# Patient Record
Sex: FEMALE | Race: WHITE | NOT HISPANIC OR LATINO | Employment: FULL TIME | URBAN - METROPOLITAN AREA
[De-identification: names, ages, dates, MRNs, and addresses within clinical notes are randomized per-mention and may not be internally consistent; named-entity substitution may affect disease eponyms.]

---

## 2017-05-03 ENCOUNTER — OFFICE VISIT (OUTPATIENT)
Dept: RETAIL CLINIC | Facility: CLINIC | Age: 52
End: 2017-05-03

## 2017-05-03 VITALS
SYSTOLIC BLOOD PRESSURE: 104 MMHG | DIASTOLIC BLOOD PRESSURE: 70 MMHG | TEMPERATURE: 97.3 F | HEART RATE: 59 BPM | RESPIRATION RATE: 20 BRPM | OXYGEN SATURATION: 98 %

## 2017-05-03 DIAGNOSIS — N39.0 UTI (URINARY TRACT INFECTION), BACTERIAL: Primary | ICD-10-CM

## 2017-05-03 DIAGNOSIS — A49.9 UTI (URINARY TRACT INFECTION), BACTERIAL: Primary | ICD-10-CM

## 2017-05-03 LAB
BILIRUB BLD-MCNC: NEGATIVE MG/DL
CLARITY, POC: ABNORMAL
COLOR UR: YELLOW
GLUCOSE UR STRIP-MCNC: NEGATIVE MG/DL
KETONES UR QL: NEGATIVE
LEUKOCYTE EST, POC: ABNORMAL
NITRITE UR-MCNC: POSITIVE MG/ML
PH UR: 6.5 [PH] (ref 5–8)
PROT UR STRIP-MCNC: ABNORMAL MG/DL
RBC # UR STRIP: ABNORMAL /UL
SP GR UR: 1.03 (ref 1–1.03)
UROBILINOGEN UR QL: NORMAL

## 2017-05-03 PROCEDURE — 81003 URINALYSIS AUTO W/O SCOPE: CPT | Performed by: NURSE PRACTITIONER

## 2017-05-03 PROCEDURE — 99203 OFFICE O/P NEW LOW 30 MIN: CPT | Performed by: NURSE PRACTITIONER

## 2017-05-03 RX ORDER — NITROFURANTOIN 25; 75 MG/1; MG/1
100 CAPSULE ORAL 2 TIMES DAILY
Qty: 10 CAPSULE | Refills: 0 | Status: SHIPPED | OUTPATIENT
Start: 2017-05-03 | End: 2017-05-08

## 2017-05-03 RX ORDER — PHENAZOPYRIDINE HYDROCHLORIDE 200 MG/1
200 TABLET, FILM COATED ORAL 3 TIMES DAILY PRN
Qty: 6 TABLET | Refills: 0 | Status: SHIPPED | OUTPATIENT
Start: 2017-05-03 | End: 2017-05-05

## 2021-02-17 ENCOUNTER — HOSPITAL ENCOUNTER (EMERGENCY)
Facility: HOSPITAL | Age: 56
Discharge: HOME | End: 2021-02-17
Attending: EMERGENCY MEDICINE | Admitting: EMERGENCY MEDICINE

## 2021-02-17 ENCOUNTER — APPOINTMENT (OUTPATIENT)
Dept: GENERAL RADIOLOGY | Facility: HOSPITAL | Age: 56
End: 2021-02-17

## 2021-02-17 VITALS
DIASTOLIC BLOOD PRESSURE: 77 MMHG | WEIGHT: 200 LBS | HEIGHT: 66 IN | HEART RATE: 67 BPM | TEMPERATURE: 98.1 F | OXYGEN SATURATION: 98 % | RESPIRATION RATE: 18 BRPM | SYSTOLIC BLOOD PRESSURE: 115 MMHG | BODY MASS INDEX: 32.14 KG/M2

## 2021-02-17 DIAGNOSIS — S93.402A MODERATE ANKLE SPRAIN, LEFT, INITIAL ENCOUNTER: Primary | ICD-10-CM

## 2021-02-17 PROCEDURE — 99283 EMERGENCY DEPT VISIT LOW MDM: CPT

## 2021-02-17 PROCEDURE — 99283 EMERGENCY DEPT VISIT LOW MDM: CPT | Performed by: EMERGENCY MEDICINE

## 2021-02-17 PROCEDURE — 73610 X-RAY EXAM OF ANKLE: CPT

## 2021-02-17 NOTE — ED PROVIDER NOTES
"Subjective   Laura Barney is a 54 yo WF who presents secondary to left ankle injury.  Patient states she was stepping over small amount of snow in her driveway to get into her vehicle.  Her right foot slipped.  She fell on her buttocks injuring her left ankle on the way.  Patient is status post ORIF of left ankle secondary to horse falling on her in 2007 or 2008.  Patient states \"I broke it\" referring to today's injury.  No other injury.  Patient presents for evaluation.      History provided by:  Patient      Review of Systems   Constitutional: Negative.  Negative for fever.   HENT: Negative.  Negative for rhinorrhea.    Eyes: Negative.  Negative for redness.   Respiratory: Negative for cough.    Cardiovascular: Negative for chest pain.   Gastrointestinal: Negative for abdominal pain.   Endocrine: Negative.    Genitourinary: Negative.  Negative for difficulty urinating.   Musculoskeletal: Negative.  Negative for back pain.   Skin: Negative.  Negative for color change.   Neurological: Negative.  Negative for syncope.   Hematological: Negative.    Psychiatric/Behavioral: Negative.    All other systems reviewed and are negative.      Past Medical History:   Diagnosis Date   • Herpes simplex        Allergies   Allergen Reactions   • Penicillins Hives       Past Surgical History:   Procedure Laterality Date   • ANKLE SURGERY Left     hardware after fracture   • CLOSED REDUCTION DISTAL RADIUS FRACTURE Bilateral    • FRACTURE SURGERY Bilateral     upper extremities with hardware placement after fracture       Family History   Problem Relation Age of Onset   • Heart disease Mother    • No Known Problems Father        Social History     Socioeconomic History   • Marital status:      Spouse name: Not on file   • Number of children: Not on file   • Years of education: Not on file   • Highest education level: Not on file   Tobacco Use   • Smoking status: Former Smoker     Types: Cigarettes     Quit date: 2007     " Years since quittin.1   • Smokeless tobacco: Never Used   Substance and Sexual Activity   • Alcohol use: Never     Frequency: Never   • Drug use: Defer   • Sexual activity: Defer           Objective   Physical Exam  Vitals signs and nursing note reviewed.   Constitutional:       General: She is not in acute distress.     Appearance: Normal appearance. She is not ill-appearing or diaphoretic.      Comments: 55-year-old white female laying in bed.  Patient appears in good overall health.  She is a bit overweight.   HENT:      Head: Normocephalic and atraumatic.   Musculoskeletal:      Left ankle: She exhibits decreased range of motion (Secondary to pain) and swelling (Slight lateral malleolus.). She exhibits no deformity and normal pulse. Tenderness. Lateral malleolus tenderness found. No medial malleolus, no posterior TFL, no head of 5th metatarsal and no proximal fibula tenderness found. Achilles tendon normal.        Feet:    Skin:     General: Skin is warm and dry.      Capillary Refill: Capillary refill takes less than 2 seconds.   Neurological:      General: No focal deficit present.      Mental Status: She is alert and oriented to person, place, and time.   Psychiatric:         Mood and Affect: Mood normal.         Behavior: Behavior normal.         Procedures           ED Course  ED Course as of  0900   Wed 2021   0811 Mild tenderness and swelling along lateral malleolus.  No other injury.  Obtaining x-ray of left ankle.    [SS]   0857 X-rays only notable for soft tissue swelling and postsurgical changes.  Will place patient in a walking boot for support.  Ortho for follow-up.I have discussed at length with patient (including family if appropriate) all results, diagnoses, treatment, indications to return to emergency room and follow-up.  Will d/c home.        [SS]      ED Course User Index  [SS] Messi Steven MD      Xr Ankle 3+ View Left    Result Date: 2021  Narrative: XR ANKLE  3+ VW LEFT-: 2/17/2021 8:38 AM  INDICATION: Slip and fall this morning. Pain when trying to stand up. Swelling laterally and history of prior ankle surgery..  COMPARISON: 09/13/2008.  FINDINGS: 3 view(s) of the left ankle.  There is lateral soft tissue swelling. No distinct acute fracture. There are faint lucencies adjacent to the surgical hardware present in the distal fibula but no cortical disruption is present and these are nonspecific but likely relate to sequela of prior surgery. The patient is status post screw and plate fixation of a distal fibular fracture and threaded screw fixation of a medial malleolar fracture. No bone erosion or destruction.  No foreign body.      Impression:  1. No acute fracture. Lateral soft tissue swelling and post operative changes related to bimalleolar fracture repair..  This report was finalized on 2/17/2021 8:48 AM by Dr. Bladimir Currie MD.      My diagnosis for lower extremity pain and injury includes but is not limited to hip fracture, femur fracture, hip dislocation, hip contusion, hip sprain, hip strain, pelvic fracture, knee sprain, patella dislocation, knee dislocation, internal derangement of knee, fractures of the femur, tibia, fibula, ankle, foot and digits, ankle sprain, ankle dislocation, Lisfranc fracture, fracture dislocations of the digits, pulmonary embolism, claudication, peripheral vascular disease, gout, osteoarthritis, rheumatoid arthritis, bursitis, septic joint, poly-rheumatica, polyarthralgia and other inflammatory or infectious disease processes.                                       MDM    Final diagnoses:   Moderate ankle sprain, left, initial encounter            Messi Steven MD  02/17/21 0989

## 2021-02-17 NOTE — DISCHARGE INSTRUCTIONS
Tylenol or ibuprofen as needed for pain.  Ice and elevate.  wear walking boot x1 week as needed for comfort and support.  Follow-up with orthopedics as above.  Return to ED for medical emergencies.

## 2021-02-18 ENCOUNTER — OFFICE VISIT (OUTPATIENT)
Dept: ORTHOPEDIC SURGERY | Facility: CLINIC | Age: 56
End: 2021-02-18

## 2021-02-18 VITALS
SYSTOLIC BLOOD PRESSURE: 129 MMHG | HEART RATE: 78 BPM | WEIGHT: 185 LBS | DIASTOLIC BLOOD PRESSURE: 80 MMHG | BODY MASS INDEX: 29.73 KG/M2 | HEIGHT: 66 IN

## 2021-02-18 DIAGNOSIS — T84.84XA PAINFUL ORTHOPAEDIC HARDWARE (HCC): Primary | ICD-10-CM

## 2021-02-18 DIAGNOSIS — M25.472 ANKLE SWELLING, LEFT: ICD-10-CM

## 2021-02-18 PROCEDURE — 99203 OFFICE O/P NEW LOW 30 MIN: CPT | Performed by: NURSE PRACTITIONER

## 2021-02-18 RX ORDER — PREDNISONE 10 MG/1
TABLET ORAL
Qty: 33 TABLET | Refills: 0 | Status: SHIPPED | OUTPATIENT
Start: 2021-02-18

## 2021-02-18 NOTE — PROGRESS NOTES
Subjective:     Patient ID: Laura Barney is a 55 y.o. female.    Chief Complaint:  Left ankle pain, injury  History of Present Illness  Laura Barney 55-year-old female presents to clinic for evaluation of her left ankle.  Status post open reduction internal fixation approximately  with Dr. Bundy.  For the last 6 months she has noticed that the ankle is able to twist more easily does feel as if she can feel a screw at the lateral aspect, most inferior aspect of the ankle.  She has noticed a cramping sensation to along the distal aspect of the fibula however did suffer an injury on 2020 when she twisted the ankle.  Presented to [facility] ER x-ray images were completed fitted with a short walking boot encouraged to follow-up in our office.  Rates discomfort with weightbearing activities and an 8-9 out of a 10 aching, stabbing, sharp, throbbing in nature.  Denies presence of numbness or tingling in the left lower extremity.  Is not experiencing calf pain.  X-ray imaging available for viewing in chart.  Currently taking ibuprofen only as needed.  Denies other concerns present this time.     Social History     Occupational History   • Not on file   Tobacco Use   • Smoking status: Former Smoker     Types: Cigarettes     Quit date:      Years since quittin.1   • Smokeless tobacco: Never Used   Substance and Sexual Activity   • Alcohol use: Never     Frequency: Never   • Drug use: Defer   • Sexual activity: Defer      Past Medical History:   Diagnosis Date   • Herpes simplex      Past Surgical History:   Procedure Laterality Date   • ANKLE SURGERY Left     hardware after fracture   • CLOSED REDUCTION DISTAL RADIUS FRACTURE Bilateral    • FRACTURE SURGERY Bilateral     upper extremities with hardware placement after fracture       Family History   Problem Relation Age of Onset   • Heart disease Mother    • No Known Problems Father          Review of Systems   Constitutional: Negative for  "chills, diaphoresis, fever and unexpected weight change.   HENT: Negative for hearing loss, nosebleeds, sore throat and tinnitus.    Eyes: Negative for pain and visual disturbance.   Respiratory: Negative for cough, shortness of breath and wheezing.    Cardiovascular: Negative for chest pain and palpitations.   Gastrointestinal: Negative for abdominal pain, diarrhea, nausea and vomiting.   Endocrine: Negative for cold intolerance, heat intolerance and polydipsia.   Genitourinary: Negative for difficulty urinating, dysuria and hematuria.   Musculoskeletal: Positive for arthralgias and myalgias. Negative for joint swelling.   Skin: Negative for rash and wound.   Allergic/Immunologic: Negative for environmental allergies.   Neurological: Negative for dizziness, syncope and numbness.   Hematological: Does not bruise/bleed easily.   Psychiatric/Behavioral: Negative for dysphoric mood and sleep disturbance. The patient is not nervous/anxious.            Objective:  Physical Exam    Vital signs reviewed.   General: No acute distress.  Eyes: conjunctiva clear; pupils equally round and reactive  ENT: external ears and nose atraumatic; oropharynx clear  CV: no peripheral edema  Resp: normal respiratory effort  Skin: no rashes or wounds; normal turgor  Psych: mood and affect appropriate; recent and remote memory intact    Vitals:    02/18/21 1325   BP: 129/80   Pulse: 78   Weight: 83.9 kg (185 lb)   Height: 167.6 cm (66\")         02/18/21  1325   Weight: 83.9 kg (185 lb)     Body mass index is 29.86 kg/m².      Left Ankle Exam     Tenderness   The patient is experiencing tenderness in the lateral malleolus.   Swelling: moderate    Range of Motion   Dorsiflexion: 25   Plantar flexion: 45     Muscle Strength   Dorsiflexion:  3/5   Plantar flexion:  3/5     Other   Erythema: absent  Sensation: normal  Pulse: present    Comments:  Scattered ecchymosis noted lateral aspect of the ankle, pain radiating superior fibula  2+ dorsalis " pedis pulse  Negative calf tenderness, negative Homans' sign  Flex extend all digits left foot                 Imaging:  Xr Ankle 3+ View Left    Result Date: 2021   1. No acute fracture. Lateral soft tissue swelling and post operative changes related to bimalleolar fracture repair..  This report was finalized on 2021 8:48 AM by Dr. Bladimir Currie MD.      Three-view x-ray imaging left ankle negative for acute fracture or dislocation, hardware intact, lateral soft tissue swelling noted  Assessment:        1. Painful orthopaedic hardware (CMS/HCC)    2. Ankle swelling, left           Plan:  1.  Discussed plan of care with patient.  Given the unstable sensation, pain previously experienced prior to injury we will proceed with CT to evaluate for hardware loosening.  2.  We will start prednisone taper to further reduce the swelling.  She was transition from the short walking boot to a tall walking boot which was provided more comfort for her, weightbearing as tolerated.  Plan to see her back in clinic after completion of testing discussed results and further plan of care.  All questions answered.  We will likely call with CT results and further guidance on treatment plan.  Orders:  Orders Placed This Encounter   Procedures   • CT ankle left wo contrast       Medications:  New Medications Ordered This Visit   Medications   • predniSONE (DELTASONE) 10 MG tablet     Si mg daily x 3 days, 30 mg daily x 3 days, 20 mg daily x 3 days, 10 mg daily x 3 days     Dispense:  33 tablet     Refill:  0       Followup:  No follow-ups on file.    Diagnoses and all orders for this visit:    1. Painful orthopaedic hardware (CMS/HCC) (Primary)  -     CT ankle left wo contrast; Future    2. Ankle swelling, left  -     CT ankle left wo contrast; Future    Other orders  -     predniSONE (DELTASONE) 10 MG tablet; 50 mg daily x 3 days, 30 mg daily x 3 days, 20 mg daily x 3 days, 10 mg daily x 3 days  Dispense: 33 tablet; Refill:  0          I ordered and reviewed the [place] today.     Dictated utilizing Dragon dictation